# Patient Record
Sex: FEMALE | Race: WHITE | NOT HISPANIC OR LATINO | Employment: FULL TIME | URBAN - METROPOLITAN AREA
[De-identification: names, ages, dates, MRNs, and addresses within clinical notes are randomized per-mention and may not be internally consistent; named-entity substitution may affect disease eponyms.]

---

## 2023-06-21 PROBLEM — G47.00 INSOMNIA: Status: ACTIVE | Noted: 2023-06-21

## 2023-06-21 PROBLEM — N39.3 STRESS INCONTINENCE, FEMALE: Status: ACTIVE | Noted: 2023-06-21

## 2023-06-21 PROBLEM — M62.81 MUSCLE WEAKNESS: Status: ACTIVE | Noted: 2023-06-21

## 2023-06-21 PROBLEM — R91.8 LUNG NODULES: Status: ACTIVE | Noted: 2023-06-21

## 2023-06-21 PROBLEM — J30.9 ALLERGIC RHINITIS: Status: ACTIVE | Noted: 2023-06-21

## 2023-06-21 PROBLEM — R97.1 CANCER ANTIGEN 125 (CA 125) ELEVATION: Status: ACTIVE | Noted: 2023-06-21

## 2023-06-21 PROBLEM — E55.9 VITAMIN D DEFICIENCY: Status: ACTIVE | Noted: 2023-06-21

## 2023-06-21 PROBLEM — L71.9 ACNE ROSACEA: Status: ACTIVE | Noted: 2023-06-21

## 2023-06-21 RX ORDER — ZOLPIDEM TARTRATE 5 MG/1
5 TABLET ORAL NIGHTLY PRN
COMMUNITY
End: 2023-06-22 | Stop reason: ALTCHOICE

## 2023-06-21 RX ORDER — VENLAFAXINE HYDROCHLORIDE 37.5 MG/1
CAPSULE, EXTENDED RELEASE ORAL
COMMUNITY
Start: 2022-11-21 | End: 2023-06-22 | Stop reason: SDUPTHER

## 2023-06-21 RX ORDER — IBUPROFEN 100 MG/5ML
1 SUSPENSION, ORAL (FINAL DOSE FORM) ORAL DAILY
COMMUNITY
Start: 2022-09-29

## 2023-06-21 RX ORDER — CHOLECALCIFEROL (VITAMIN D3) 25 MCG
1 TABLET ORAL DAILY
COMMUNITY
Start: 2022-09-29

## 2023-06-21 RX ORDER — MAGNESIUM GLYCINATE 100 MG
1 CAPSULE ORAL 2 TIMES DAILY
COMMUNITY
Start: 2022-09-29

## 2023-06-21 RX ORDER — SOY ISOFLAV/BCOHOSH/CISSUS QUA 198 MG
1 CAPSULE ORAL DAILY
COMMUNITY
Start: 2022-09-29

## 2023-06-22 ENCOUNTER — OFFICE VISIT (OUTPATIENT)
Dept: PRIMARY CARE | Facility: CLINIC | Age: 53
End: 2023-06-22
Payer: COMMERCIAL

## 2023-06-22 VITALS
RESPIRATION RATE: 16 BRPM | HEIGHT: 65 IN | WEIGHT: 166 LBS | HEART RATE: 72 BPM | BODY MASS INDEX: 27.66 KG/M2 | DIASTOLIC BLOOD PRESSURE: 72 MMHG | OXYGEN SATURATION: 100 % | SYSTOLIC BLOOD PRESSURE: 122 MMHG | TEMPERATURE: 98.2 F

## 2023-06-22 DIAGNOSIS — M79.671 FOOT PAIN, BILATERAL: ICD-10-CM

## 2023-06-22 DIAGNOSIS — Z12.31 ENCOUNTER FOR SCREENING MAMMOGRAM FOR MALIGNANT NEOPLASM OF BREAST: ICD-10-CM

## 2023-06-22 DIAGNOSIS — R23.2 HOT FLASHES: Primary | ICD-10-CM

## 2023-06-22 DIAGNOSIS — Z00.00 HEALTH CARE MAINTENANCE: ICD-10-CM

## 2023-06-22 DIAGNOSIS — M79.672 FOOT PAIN, BILATERAL: ICD-10-CM

## 2023-06-22 PROBLEM — R91.8 LUNG NODULES: Status: RESOLVED | Noted: 2023-06-21 | Resolved: 2023-06-22

## 2023-06-22 PROBLEM — M62.81 MUSCLE WEAKNESS: Status: RESOLVED | Noted: 2023-06-21 | Resolved: 2023-06-22

## 2023-06-22 PROCEDURE — 99396 PREV VISIT EST AGE 40-64: CPT | Performed by: INTERNAL MEDICINE

## 2023-06-22 PROCEDURE — 1036F TOBACCO NON-USER: CPT | Performed by: INTERNAL MEDICINE

## 2023-06-22 RX ORDER — VENLAFAXINE HYDROCHLORIDE 37.5 MG/1
CAPSULE, EXTENDED RELEASE ORAL
Qty: 90 CAPSULE | Refills: 3 | Status: SHIPPED | OUTPATIENT
Start: 2023-06-22

## 2023-06-22 ASSESSMENT — ENCOUNTER SYMPTOMS
NAUSEA: 0
EYE PAIN: 0
DIARRHEA: 0
HEADACHES: 0
ARTHRALGIAS: 0
OCCASIONAL FEELINGS OF UNSTEADINESS: 0
DEPRESSION: 0
LOSS OF SENSATION IN FEET: 0
PALPITATIONS: 0
PSYCHIATRIC NEGATIVE: 1
ABDOMINAL PAIN: 0
SHORTNESS OF BREATH: 0
DIFFICULTY URINATING: 0
FREQUENCY: 0
BACK PAIN: 0
FEVER: 0
EYE ITCHING: 0
WHEEZING: 0
SORE THROAT: 0
FATIGUE: 0
CONSTIPATION: 0
RHINORRHEA: 0
UNEXPECTED WEIGHT CHANGE: 0
DYSURIA: 0
EYE REDNESS: 0
COUGH: 0
WEAKNESS: 0

## 2023-06-22 ASSESSMENT — PATIENT HEALTH QUESTIONNAIRE - PHQ9
2. FEELING DOWN, DEPRESSED OR HOPELESS: NOT AT ALL
1. LITTLE INTEREST OR PLEASURE IN DOING THINGS: NOT AT ALL
SUM OF ALL RESPONSES TO PHQ9 QUESTIONS 1 AND 2: 0

## 2023-06-22 ASSESSMENT — PAIN SCALES - GENERAL: PAINLEVEL: 5

## 2023-06-22 NOTE — PROGRESS NOTES
"Blanca Segal is a 52 y.o. female who presents for Annual Exam.    HPI   Influenza declines  Covid 2020, 2021  Shingrix discussed will think  Tdap 2021  Mammogram 2/2022  Pap 2021, Hysterectomy 2022  Colonoscopy 2022  Bmi 27  Depression screen 23  Eye exam due    Pt reports new onset of pain to L forearm.  Painful, weak w/lifting objects.  Denies paresthesias.  Pain 5/10.    Continues pain to sides of feet.  Started after Hysterectomy.  Has not seen Ortho/Podiatry.    Review of Systems   Constitutional:  Negative for fatigue, fever and unexpected weight change.   HENT:  Negative for congestion, ear pain, rhinorrhea and sore throat.    Eyes:  Negative for pain, redness and itching.   Respiratory:  Negative for cough, shortness of breath and wheezing.    Cardiovascular:  Negative for chest pain, palpitations and leg swelling.   Gastrointestinal:  Negative for abdominal pain, constipation, diarrhea and nausea.   Genitourinary:  Negative for difficulty urinating, dysuria and frequency.   Musculoskeletal:  Negative for arthralgias and back pain.   Allergic/Immunologic: Negative for environmental allergies, food allergies and immunocompromised state.   Neurological:  Negative for weakness and headaches.   Psychiatric/Behavioral: Negative.     All other systems reviewed and are negative.      Health Maintenance Due   Topic Date Due    Yearly Adult Physical  Never done    Hepatitis B Vaccines (1 of 3 - 3-dose series) Never done    HIV Screening  Never done    MMR Vaccines (1 of 1 - Standard series) Never done    Hepatitis C Screening  Never done    Diabetes Screening  Never done    Zoster Vaccines (1 of 2) Never done    COVID-19 Vaccine (4 - Booster for Pfizer series) 11/24/2021    Mammogram  02/04/2023       Objective   /72   Pulse 72   Temp 36.8 °C (98.2 °F)   Resp 16   Ht 1.651 m (5' 5\")   Wt 75.3 kg (166 lb)   SpO2 100%   BMI 27.62 kg/m²     Physical Exam  Vitals and nursing note reviewed. "   Constitutional:       Appearance: Normal appearance.   HENT:      Head: Normocephalic.   Eyes:      Conjunctiva/sclera: Conjunctivae normal.      Pupils: Pupils are equal, round, and reactive to light.   Cardiovascular:      Rate and Rhythm: Normal rate and regular rhythm.      Pulses: Normal pulses.      Heart sounds: Normal heart sounds.   Pulmonary:      Effort: Pulmonary effort is normal.      Breath sounds: Normal breath sounds.   Musculoskeletal:         General: No swelling.      Cervical back: Neck supple.   Skin:     General: Skin is warm and dry.   Neurological:      General: No focal deficit present.      Mental Status: She is oriented to person, place, and time.         Assessment/Plan   Problem List Items Addressed This Visit    None  Visit Diagnoses       Hot flashes    -  Primary    Relevant Medications    venlafaxine XR (Effexor XR) 37.5 mg 24 hr capsule    Health care maintenance        Relevant Orders    CBC    Comprehensive Metabolic Panel    Lipid Panel    Thyroid Stimulating Hormone    Vitamin B12    Vitamin D, Total    Encounter for screening mammogram for malignant neoplasm of breast        Relevant Orders    BI mammo bilateral screening tomosynthesis    Foot pain, bilateral        Relevant Orders    Referral to Podiatry

## 2024-06-13 ENCOUNTER — OFFICE VISIT (OUTPATIENT)
Dept: PRIMARY CARE | Facility: CLINIC | Age: 54
End: 2024-06-13
Payer: COMMERCIAL

## 2024-06-13 VITALS
HEART RATE: 80 BPM | TEMPERATURE: 98.2 F | WEIGHT: 167 LBS | BODY MASS INDEX: 27.82 KG/M2 | SYSTOLIC BLOOD PRESSURE: 118 MMHG | DIASTOLIC BLOOD PRESSURE: 74 MMHG | OXYGEN SATURATION: 98 % | HEIGHT: 65 IN | RESPIRATION RATE: 16 BRPM

## 2024-06-13 DIAGNOSIS — J01.10 ACUTE NON-RECURRENT FRONTAL SINUSITIS: Primary | ICD-10-CM

## 2024-06-13 PROCEDURE — 99213 OFFICE O/P EST LOW 20 MIN: CPT | Performed by: INTERNAL MEDICINE

## 2024-06-13 PROCEDURE — 1036F TOBACCO NON-USER: CPT | Performed by: INTERNAL MEDICINE

## 2024-06-13 RX ORDER — METHYLPREDNISOLONE 4 MG/1
TABLET ORAL
Qty: 21 TABLET | Refills: 0 | Status: SHIPPED | OUTPATIENT
Start: 2024-06-13 | End: 2024-06-20

## 2024-06-13 RX ORDER — CEFDINIR 300 MG/1
300 CAPSULE ORAL 2 TIMES DAILY
Qty: 20 CAPSULE | Refills: 0 | Status: SHIPPED | OUTPATIENT
Start: 2024-06-13 | End: 2024-06-23

## 2024-06-13 ASSESSMENT — PATIENT HEALTH QUESTIONNAIRE - PHQ9
2. FEELING DOWN, DEPRESSED OR HOPELESS: NOT AT ALL
SUM OF ALL RESPONSES TO PHQ9 QUESTIONS 1 AND 2: 0
1. LITTLE INTEREST OR PLEASURE IN DOING THINGS: NOT AT ALL

## 2024-06-13 ASSESSMENT — ENCOUNTER SYMPTOMS
FEVER: 0
FATIGUE: 0
COUGH: 0
SHORTNESS OF BREATH: 0

## 2024-06-13 NOTE — PROGRESS NOTES
"Blanca Segal is a 53 y.o. female who presents for URI symptoms.    HPI   Pt c/o URI sx's x1 week.  Started w/tender lymph nodes, sore throat initially.  Currently c/o Productive Cough expectorating yellow tinged sputum, nasal congestion, post nasal drip, L ear pain, loss of voice.  Denies chest pain/pressure, dyspnea  Afebrile.  Tx: Nyquil.    Review of Systems   Constitutional:  Negative for fatigue and fever.   Respiratory:  Negative for cough and shortness of breath.    Cardiovascular:  Negative for chest pain and leg swelling.   All other systems reviewed and are negative.      Health Maintenance Due   Topic Date Due    HIV Screening  Never done    MMR Vaccines (1 of 1 - Standard series) Never done    Hepatitis C Screening  Never done    Diabetes Screening  Never done    Hepatitis B Vaccines (1 of 3 - 19+ 3-dose series) Never done    Zoster Vaccines (1 of 2) Never done    Mammogram  02/04/2023    COVID-19 Vaccine (4 - 2023-24 season) 09/01/2023    Yearly Adult Physical  06/23/2024       Objective   /74   Pulse 80   Temp 36.8 °C (98.2 °F)   Resp 16   Ht 1.651 m (5' 5\")   Wt 75.8 kg (167 lb)   SpO2 98%   BMI 27.79 kg/m²     Physical Exam  Vitals and nursing note reviewed.   Constitutional:       Appearance: Normal appearance.   HENT:      Head: Normocephalic.   Eyes:      Conjunctiva/sclera: Conjunctivae normal.      Pupils: Pupils are equal, round, and reactive to light.   Cardiovascular:      Rate and Rhythm: Normal rate and regular rhythm.      Pulses: Normal pulses.      Heart sounds: Normal heart sounds.   Pulmonary:      Effort: Pulmonary effort is normal.      Breath sounds: Normal breath sounds.   Musculoskeletal:         General: No swelling.      Cervical back: Neck supple.   Skin:     General: Skin is warm and dry.   Neurological:      General: No focal deficit present.      Mental Status: She is oriented to person, place, and time.         Assessment/Plan   Problem List " Items Addressed This Visit    None  Visit Diagnoses       Acute non-recurrent frontal sinusitis    -  Primary    Relevant Medications    cefdinir (Omnicef) 300 mg capsule    methylPREDNISolone (Medrol Dospak) 4 mg tablets

## 2024-06-24 ENCOUNTER — APPOINTMENT (OUTPATIENT)
Dept: PRIMARY CARE | Facility: CLINIC | Age: 54
End: 2024-06-24
Payer: COMMERCIAL

## 2024-06-24 VITALS
HEIGHT: 65 IN | RESPIRATION RATE: 16 BRPM | WEIGHT: 168 LBS | TEMPERATURE: 97.6 F | DIASTOLIC BLOOD PRESSURE: 78 MMHG | SYSTOLIC BLOOD PRESSURE: 128 MMHG | BODY MASS INDEX: 27.99 KG/M2 | HEART RATE: 88 BPM | OXYGEN SATURATION: 96 %

## 2024-06-24 DIAGNOSIS — Z00.00 HEALTH CARE MAINTENANCE: Primary | ICD-10-CM

## 2024-06-24 DIAGNOSIS — Z12.31 ENCOUNTER FOR SCREENING MAMMOGRAM FOR MALIGNANT NEOPLASM OF BREAST: ICD-10-CM

## 2024-06-24 PROCEDURE — 99396 PREV VISIT EST AGE 40-64: CPT | Performed by: INTERNAL MEDICINE

## 2024-06-24 PROCEDURE — 1036F TOBACCO NON-USER: CPT | Performed by: INTERNAL MEDICINE

## 2024-06-24 ASSESSMENT — ENCOUNTER SYMPTOMS
WEAKNESS: 0
CONSTIPATION: 0
WHEEZING: 0
EYE ITCHING: 0
FEVER: 0
PALPITATIONS: 0
PSYCHIATRIC NEGATIVE: 1
COUGH: 0
RHINORRHEA: 0
FREQUENCY: 0
EYE PAIN: 0
SHORTNESS OF BREATH: 0
BACK PAIN: 0
ABDOMINAL PAIN: 0
FATIGUE: 0
EYE REDNESS: 0
SORE THROAT: 0
UNEXPECTED WEIGHT CHANGE: 0
HEADACHES: 0
DIFFICULTY URINATING: 0
NAUSEA: 0
DYSURIA: 0
DIARRHEA: 0
ARTHRALGIAS: 0

## 2024-06-24 ASSESSMENT — PATIENT HEALTH QUESTIONNAIRE - PHQ9
1. LITTLE INTEREST OR PLEASURE IN DOING THINGS: NOT AT ALL
2. FEELING DOWN, DEPRESSED OR HOPELESS: NOT AT ALL
SUM OF ALL RESPONSES TO PHQ9 QUESTIONS 1 AND 2: 0

## 2024-06-24 NOTE — PROGRESS NOTES
"Blanca Segal is a 53 y.o. female who presents for Annual Exam.    HPI   Influenza declines  Covid 2020, 2021  Shingrix declines  Tdap 2021  Mammogram 2022  Pap Hysterectomy  Colonoscopy 2022  Bmi 28  Eye exam 24  Depression screen 24    Review of Systems   Constitutional:  Negative for fatigue, fever and unexpected weight change.   HENT:  Negative for congestion, ear pain, rhinorrhea and sore throat.    Eyes:  Negative for pain, redness and itching.   Respiratory:  Negative for cough, shortness of breath and wheezing.    Cardiovascular:  Negative for chest pain, palpitations and leg swelling.   Gastrointestinal:  Negative for abdominal pain, constipation, diarrhea and nausea.   Genitourinary:  Negative for difficulty urinating, dysuria and frequency.   Musculoskeletal:  Negative for arthralgias and back pain.   Allergic/Immunologic: Negative for environmental allergies, food allergies and immunocompromised state.   Neurological:  Negative for weakness and headaches.   Psychiatric/Behavioral: Negative.     All other systems reviewed and are negative.      Health Maintenance Due   Topic Date Due    Yearly Adult Physical  Never done    HIV Screening  Never done    MMR Vaccines (1 of 1 - Standard series) Never done    Hepatitis C Screening  Never done    Diabetes Screening  Never done    Hepatitis B Vaccines (1 of 3 - 19+ 3-dose series) Never done    Zoster Vaccines (1 of 2) Never done    Mammogram  02/04/2023    COVID-19 Vaccine (4 - 2023-24 season) 09/01/2023       Objective   /78   Pulse 88   Temp 36.4 °C (97.6 °F)   Resp 16   Ht 1.638 m (5' 4.5\")   Wt 76.2 kg (168 lb)   SpO2 96%   BMI 28.39 kg/m²     Physical Exam  Vitals and nursing note reviewed.   Constitutional:       Appearance: Normal appearance.   HENT:      Head: Normocephalic.   Eyes:      Conjunctiva/sclera: Conjunctivae normal.      Pupils: Pupils are equal, round, and reactive to light.   Cardiovascular:      Rate and Rhythm: " Normal rate and regular rhythm.      Pulses: Normal pulses.      Heart sounds: Normal heart sounds.   Pulmonary:      Effort: Pulmonary effort is normal.      Breath sounds: Normal breath sounds.   Musculoskeletal:         General: No swelling.      Cervical back: Neck supple.   Skin:     General: Skin is warm and dry.   Neurological:      General: No focal deficit present.      Mental Status: She is oriented to person, place, and time.         Assessment/Plan   Problem List Items Addressed This Visit    None  Visit Diagnoses       Health care maintenance    -  Primary    Relevant Orders    CBC    Comprehensive Metabolic Panel    Lipid Panel    Thyroid Stimulating Hormone    Vitamin B12    Vitamin D 25-Hydroxy,Total (for eval of Vitamin D levels)    Encounter for screening mammogram for malignant neoplasm of breast        Relevant Orders    BI mammo bilateral screening tomosynthesis

## 2024-10-08 ENCOUNTER — OFFICE VISIT (OUTPATIENT)
Dept: PRIMARY CARE | Facility: CLINIC | Age: 54
End: 2024-10-08
Payer: COMMERCIAL

## 2024-10-08 VITALS
SYSTOLIC BLOOD PRESSURE: 120 MMHG | OXYGEN SATURATION: 100 % | BODY MASS INDEX: 28.66 KG/M2 | WEIGHT: 172 LBS | HEART RATE: 72 BPM | RESPIRATION RATE: 16 BRPM | HEIGHT: 65 IN | TEMPERATURE: 98 F | DIASTOLIC BLOOD PRESSURE: 72 MMHG

## 2024-10-08 DIAGNOSIS — M25.521 RIGHT ELBOW PAIN: Primary | ICD-10-CM

## 2024-10-08 PROCEDURE — 3008F BODY MASS INDEX DOCD: CPT | Performed by: INTERNAL MEDICINE

## 2024-10-08 PROCEDURE — 99213 OFFICE O/P EST LOW 20 MIN: CPT | Performed by: INTERNAL MEDICINE

## 2024-10-08 RX ORDER — METHYLPREDNISOLONE 4 MG/1
TABLET ORAL
Qty: 21 TABLET | Refills: 0 | Status: SHIPPED | OUTPATIENT
Start: 2024-10-08 | End: 2024-10-15

## 2024-10-08 NOTE — PROGRESS NOTES
"Subjective   Marie Segal is a 54 y.o. female who presents for Arm Pain.    HPI   Pt c/o R elbow pain x2 weeks.  Stiff.  Can not fully extend RUE.  Tx: Ice, heat, tape, brace. Ineffective.    Reports weight gain.  Difficulty exercising d/t arm pain.    Review of Systems   Constitutional:  Negative for fatigue and fever.   Respiratory:  Negative for cough and shortness of breath.    Cardiovascular:  Negative for chest pain and leg swelling.   All other systems reviewed and are negative.      Health Maintenance Due   Topic Date Due    HIV Screening  Never done    MMR Vaccines (1 of 1 - Standard series) Never done    Hepatitis C Screening  Never done    Diabetes Screening  Never done    Hepatitis B Vaccines (1 of 3 - 19+ 3-dose series) Never done    Zoster Vaccines (1 of 2) Never done    Mammogram  02/04/2023    Influenza Vaccine (1) 09/01/2024    COVID-19 Vaccine (4 - 2023-24 season) 09/01/2024       Objective   /72   Pulse 72   Temp 36.7 °C (98 °F)   Resp 16   Ht 1.638 m (5' 4.5\")   Wt 78 kg (172 lb)   SpO2 100%   BMI 29.07 kg/m²     Physical Exam  Vitals and nursing note reviewed.   Constitutional:       Appearance: Normal appearance.   HENT:      Head: Normocephalic.   Eyes:      Conjunctiva/sclera: Conjunctivae normal.      Pupils: Pupils are equal, round, and reactive to light.   Cardiovascular:      Rate and Rhythm: Normal rate and regular rhythm.      Pulses: Normal pulses.      Heart sounds: Normal heart sounds.   Pulmonary:      Effort: Pulmonary effort is normal.      Breath sounds: Normal breath sounds.   Musculoskeletal:         General: No swelling.      Cervical back: Neck supple.   Skin:     General: Skin is warm and dry.   Neurological:      General: No focal deficit present.      Mental Status: She is oriented to person, place, and time.     Decrease and tender rom of right elbow    Assessment/Plan   Problem List Items Addressed This Visit    None  Visit Diagnoses       Right elbow " pain    -  Primary    Relevant Medications    methylPREDNISolone (Medrol Dospak) 4 mg tablets    Other Relevant Orders    Referral to Orthopaedic Surgery

## 2024-10-09 ASSESSMENT — ENCOUNTER SYMPTOMS
SHORTNESS OF BREATH: 0
COUGH: 0
FEVER: 0
FATIGUE: 0

## 2024-10-14 ENCOUNTER — HOSPITAL ENCOUNTER (OUTPATIENT)
Dept: RADIOLOGY | Facility: CLINIC | Age: 54
Discharge: HOME | End: 2024-10-14
Payer: COMMERCIAL

## 2024-10-14 ENCOUNTER — OFFICE VISIT (OUTPATIENT)
Dept: ORTHOPEDIC SURGERY | Facility: CLINIC | Age: 54
End: 2024-10-14
Payer: COMMERCIAL

## 2024-10-14 DIAGNOSIS — M25.521 RIGHT ELBOW PAIN: ICD-10-CM

## 2024-10-14 DIAGNOSIS — M77.11 LATERAL EPICONDYLITIS OF RIGHT ELBOW: ICD-10-CM

## 2024-10-14 PROCEDURE — 73080 X-RAY EXAM OF ELBOW: CPT | Mod: RIGHT SIDE | Performed by: STUDENT IN AN ORGANIZED HEALTH CARE EDUCATION/TRAINING PROGRAM

## 2024-10-14 PROCEDURE — 99214 OFFICE O/P EST MOD 30 MIN: CPT | Performed by: STUDENT IN AN ORGANIZED HEALTH CARE EDUCATION/TRAINING PROGRAM

## 2024-10-14 PROCEDURE — 73080 X-RAY EXAM OF ELBOW: CPT | Mod: RT

## 2024-10-14 PROCEDURE — 20551 NJX 1 TENDON ORIGIN/INSJ: CPT | Mod: RT | Performed by: STUDENT IN AN ORGANIZED HEALTH CARE EDUCATION/TRAINING PROGRAM

## 2024-10-14 PROCEDURE — 2500000004 HC RX 250 GENERAL PHARMACY W/ HCPCS (ALT 636 FOR OP/ED): Performed by: STUDENT IN AN ORGANIZED HEALTH CARE EDUCATION/TRAINING PROGRAM

## 2024-10-14 RX ORDER — LIDOCAINE HYDROCHLORIDE 10 MG/ML
1 INJECTION, SOLUTION INFILTRATION; PERINEURAL
Status: COMPLETED | OUTPATIENT
Start: 2024-10-14 | End: 2024-10-14

## 2024-10-14 NOTE — PROGRESS NOTES
History of Present Illness:  Presents with  right elbow pain.  The symptoms have been present for 12 months on and off. The patient denies any inciting trauma. The pain is localized about the lateral aspect of the elbow. It is described as moderate. The pain occurs intermittantly and is worse with gripping and lifting activities. The patient presents due to persistent symptoms even with activity modification.      Review of Systems   GENERAL: Negative for malaise, significant weight loss, fever  MUSCULOSKELETAL: see HPI  NEURO:  Negative    The patient's past medical history, family history, social history, and review of systems were reviewed. History is otherwise negative except as stated in the HPI.    Physical Examination:  General: Alert and oriented to person, place, and time.  No acute distress and breathing comfortably: Pleasant and cooperative with examination.  HEENT: Head is normocephalic and atraumatic.  Neck: Supple, no visible swelling.  Cardiovascular: No palpable tachycardia  Lungs: No audible wheezing or labored breathing  Abdomen: Nondistended.  On musculoskeletal examination, the patient has full elbow range of motion. There is no obvious instability with varus and valgus testing. There is tenderness to palpation about the lateral epicondyle. There is no tenderness to palpation medially, anteriorly, or posteriorly. Pain is illicited with resisted wrist extension and forearm supination. Pain is worse with elbow extension and wrist flexion. Provocative maneuvers over the cubital tunnel are negative.  In regards to the wrist, there is no obvious deformity. Range of motion is full in flexion, extension, pronation, supination, and radial/ulnar deviation. There is no tenderness to palpation. Sensation and motor function are intact in the radial, ulnar, and median nerve distribution. There is no obvious thenar or intrinsic atrophy. All fingers are without triggering. The patient can make a full  composite fist. The hand itself is warm and well perfused. The skin is intact throughout. The contralateral hand and wrist are normal to inspection, range of motion, stability, and strength.    Imaging:  Radiographic notes:  AP, lateral, and oblique radiographs of the right elbow. These reveal no fracture/dislocation and minimal-to-no arthritis.    Hand / UE Inj/Asp: R elbow for lateral epicondylitis on 10/14/2024 1:51 PM  Indications: pain  Details: 24 G needle, lateral approach  Medications: 10 mg triamcinolone acetonide 10 mg/mL; 1 mL lidocaine 10 mg/mL (1 %)  Outcome: tolerated well, no immediate complications  Procedure, treatment alternatives, risks and benefits explained, specific risks discussed. Immediately prior to procedure a time out was called to verify the correct patient, procedure, equipment, support staff and site/side marked as required.             Assessment:  Patient with right lateral epicondylitis     Plan:  I had a long discussion with the patient regarding the diagnosis of lateral epicondylitis and its treatment. I explained that it is a self-limiting condition that tends to resolve spontaneously.   Unfortunately, no treatments have been proven to alter the natural history of this condition. Moreover, it can take a relatively long time for symptom resolution, and therefore, I have preached patience.     For now, I have recommended symptomatic treatment consisting of activity modification, NSAIDs, splinting, and physical therapy.      I have offered an injection to to persistent debilitating pain. After obtaining consent, I injected a 1mL combination of Kenalog and 1% lidocaine into the lateral epicondyle using standard, sterile technique. The injection site was dressed, and the patient tolerated the injection well.     Follow up: 3 months     Delia Rosado MD

## 2024-10-15 ENCOUNTER — APPOINTMENT (OUTPATIENT)
Dept: INTEGRATIVE MEDICINE | Facility: CLINIC | Age: 54
End: 2024-10-15
Payer: COMMERCIAL

## 2024-10-15 PROCEDURE — MASS60G MASSAGE 60 MINUTES: Performed by: MASSAGE THERAPIST

## 2024-10-15 PROCEDURE — CYTAX SALES TAX CUYAHOGA COUNT: Performed by: MASSAGE THERAPIST

## 2024-10-15 NOTE — PROGRESS NOTES
Massage Therapy Visit:     Marie Segal was self-referred.    Condition of Client Subjective :  Patient ID: Marie Segal is a 54 y.o. female who presents for reason for visit of Relaxation massage and Muscle tension release.  HPI    Session Information  Description of present complaint: Stress, Muscle tension, Range of motion (ROM), Discomfort    Review of Systems    Objective   Pre-treatment Assessment  Condition of Client Note: Requests focus on neck, shoulders.    Physical Exam    Actions Assessment/Plan :    Massage Treatment  Patient Position: Table, Supine, Prone  Positioning Assistance: Did not need assistance, Pillow(s)/bolster under knees while supine, Pillow(s)/bolster under anles while prone  Massage Technique: Therapeutic massage, Superficial fascial release, Myofascial release, Stretching, Mobilization, Soft tissue mobilization, Relaxation massage  Pressure Scale: 2 - Mild pressure, 3 - Medium pressure, 4 - Moderate pressure  Action Note: Upper body, kneading, stripping, palming, effleurage, petrissage, cross fiber, on Cervicals, Occipitals, Masseters, Platysma, SCM, SITS, Levator Scapulae, Trapezius, Rhomboids, upper Pectoralis, Deltoids.  UE, stretching, cross fiber, palming, kneading, effleurage, on Deltoids, Biceps, Triceps, forearm muscles, hand muscles.  LE, kneading, stripping, palming, knuckle, effleurage, petrissage, cross fiber, stretching, on Vastus group, TFL, lateral approach to Hip Flexors, Hamstrings, Sartorius, Gracilis, Gastrocnemius, Soleus, Tibialis, plantar/dorsal aspect of foot.  Prone, kneading, stripping, palming, knuckle, effleurage, petrissage, cross fiber, stretching, on SI joint, erectors, Longissimus, QL, Obliques, paraspinals, SITS, Rhomboids, Cervicals, Trapezius.    Response:  Post-treatment Assessment  Patient Noted Improvement of the Following Symptoms: Muscle tension, ROM    Evaluation:   Massage Therapy Evaluation / Recommendation(s) / Follow-up  Post-Treatment  Recommendation: Increase hydration  Follow-up: Follow up scheduled

## 2024-12-05 ENCOUNTER — APPOINTMENT (OUTPATIENT)
Dept: INTEGRATIVE MEDICINE | Facility: CLINIC | Age: 54
End: 2024-12-05

## 2025-01-17 ENCOUNTER — APPOINTMENT (OUTPATIENT)
Dept: ORTHOPEDIC SURGERY | Facility: CLINIC | Age: 55
End: 2025-01-17

## 2025-01-23 ENCOUNTER — APPOINTMENT (OUTPATIENT)
Dept: INTEGRATIVE MEDICINE | Facility: CLINIC | Age: 55
End: 2025-01-23

## 2025-02-20 ENCOUNTER — APPOINTMENT (OUTPATIENT)
Dept: INTEGRATIVE MEDICINE | Facility: CLINIC | Age: 55
End: 2025-02-20

## 2025-02-20 PROCEDURE — MASSG60 MASSAGE 60 MINUTES: Performed by: MASSAGE THERAPIST

## 2025-02-20 PROCEDURE — CYTAX SALES TAX CUYAHOGA COUNT: Performed by: MASSAGE THERAPIST

## 2025-02-20 NOTE — PROGRESS NOTES
Massage Therapy Visit:     Marie Segal was self-referred.    Condition of Client Subjective :  Patient ID: Marie Segal is a 54 y.o. female who presents for reason for visit of Muscle tension release and Relaxation massage.  HPI    Session Information  Description of present complaint: Discomfort, Chronic pain, Muscle tension, Stress, Fatigue    Review of Systems    Objective   Pre-treatment Assessment  Condition of Client Note: Requested focus on neck, shoulders    Physical Exam    Actions Assessment/Plan :    Massage Treatment  Patient Position: Table, Supine, Prone  Positioning Assistance: Did not need assistance, Pillow(s)/bolster under knees while supine, Pillow(s)/bolster under anles while prone  Massage Technique: Therapeutic massage, Relaxation massage, Mobilization, Myofascial release, Stretching  Pressure Scale: 2 - Mild pressure, 3 - Medium pressure, 4 - Moderate pressure  Action Note: Upper body, kneading, stripping, palming, effleurage, petrissage, cross fiber, on Cervicals, Occipitals, Platysma, SCM, SITS, Levator Scapulae, Trapezius, Rhomboids, upper Pectoralis, Deltoids.  UE, stretching, cross fiber, palming, kneading, effleurage, on Deltoids, Biceps, Triceps, forearm muscles, hand muscles.  LE, kneading, stripping, palming, knuckle, effleurage, petrissage, cross fiber, stretching, on Vastus group, TFL, Hamstrings, Sartorius, Gracilis, Gastrocnemius, Soleus, Tibialis, plantar/dorsal aspect of foot.  Prone, kneading, stripping, palming, knuckle, effleurage, petrissage, cross fiber, stretching, on SI joint, erectors, Longissimus, QL, Obliques, paraspinals, SITS, Rhomboids, Iliocostalis, Cervicals, Trapezius.    Response:  Post-treatment Assessment  Patient Noted Improvement of the Following Symptoms: Muscle tension, ROM    Evaluation:   Massage Therapy Evaluation / Recommendation(s) / Follow-up  Post-Treatment Recommendation: Increase hydration, stretching  Follow-up: Follow up scheduled

## 2025-03-20 ENCOUNTER — APPOINTMENT (OUTPATIENT)
Dept: INTEGRATIVE MEDICINE | Facility: CLINIC | Age: 55
End: 2025-03-20
Payer: COMMERCIAL

## 2025-05-08 ENCOUNTER — APPOINTMENT (OUTPATIENT)
Dept: INTEGRATIVE MEDICINE | Facility: CLINIC | Age: 55
End: 2025-05-08
Payer: COMMERCIAL

## 2025-06-24 ENCOUNTER — APPOINTMENT (OUTPATIENT)
Dept: PRIMARY CARE | Facility: CLINIC | Age: 55
End: 2025-06-24
Payer: COMMERCIAL

## 2025-07-10 ENCOUNTER — APPOINTMENT (OUTPATIENT)
Dept: INTEGRATIVE MEDICINE | Facility: CLINIC | Age: 55
End: 2025-07-10
Payer: COMMERCIAL

## 2025-07-10 PROCEDURE — MASSG60 MASSAGE 60 MINUTES: Performed by: MASSAGE THERAPIST

## 2025-07-10 PROCEDURE — CYTAX SALES TAX CUYAHOGA COUNT: Performed by: MASSAGE THERAPIST

## 2025-07-10 NOTE — PROGRESS NOTES
Massage Therapy Visit:     Marie Segal was self-referred.    Condition of Client Subjective :  Patient ID: Marie Segal is a 54 y.o. female who presents for reason for visit of Relaxation massage and Muscle tension release.  HPI    Session Information  Description of present complaint: Discomfort, Muscle tension, Stress, Range of motion (ROM)      Review of Systems    Objective   Pre-treatment Assessment  Condition of Client Note: Requested focus on neck, shoulders, back muscles.    Physical Exam    Actions Assessment/Plan :    Massage Treatment  Patient Position: Table, Supine, Prone  Positioning Assistance: Did not need assistance, Pillow(s)/bolster under knees while supine, Pillow(s)/bolster under anles while prone  Massage Technique: Therapeutic massage, Superficial fascial release, Myofascial release, Soft tissue mobilization, Relaxation massage  Area/Body Region: Whole body  Pressure Scale: 2 - Mild pressure, 3 - Medium pressure, 4 - Moderate pressure  Action Note: Upper body, kneading, stripping, palming, effleurage, petrissage, cross fiber, on Cervicals, Occipitals, SCM, SITS, Levator Scapulae, Trapezius, Rhomboids, upper Pectoralis, Deltoids.  UE, stretching, cross fiber, palming, kneading, effleurage, on Deltoids, Biceps, Triceps, forearm muscles, hand muscles.  LE, kneading, stripping, palming, knuckle, effleurage, petrissage, cross fiber, stretching, on Vastus group, TFL, Hamstrings, Sartorius, Gracilis, Gastrocnemius, Soleus, Tibialis, plantar/dorsal aspect of foot.  Prone, kneading, stripping, palming, knuckle, effleurage, petrissage, cross fiber, stretching, on SI joint, erectors, Longissimus, QL, Obliques, paraspinals, SITS, Cervicals, Trapezius.    Response:  Post-treatment Assessment  Patient Noted Improvement of the Following Symptoms: ROM, Muscle tension    Evaluation:   Massage Therapy Evaluation / Recommendation(s) / Follow-up  Post-Treatment Recommendation: Increase hydration,  stretching  Follow-up: Follow up scheduled